# Patient Record
Sex: FEMALE | Race: WHITE
[De-identification: names, ages, dates, MRNs, and addresses within clinical notes are randomized per-mention and may not be internally consistent; named-entity substitution may affect disease eponyms.]

---

## 2018-07-16 ENCOUNTER — HOSPITAL ENCOUNTER (EMERGENCY)
Dept: HOSPITAL 62 - ER | Age: 38
Discharge: HOME | End: 2018-07-16
Payer: OTHER GOVERNMENT

## 2018-07-16 VITALS — DIASTOLIC BLOOD PRESSURE: 70 MMHG | SYSTOLIC BLOOD PRESSURE: 105 MMHG

## 2018-07-16 DIAGNOSIS — Z71.6: ICD-10-CM

## 2018-07-16 DIAGNOSIS — G89.29: Primary | ICD-10-CM

## 2018-07-16 DIAGNOSIS — M54.41: ICD-10-CM

## 2018-07-16 DIAGNOSIS — Z91.19: ICD-10-CM

## 2018-07-16 DIAGNOSIS — F17.210: ICD-10-CM

## 2018-07-16 PROCEDURE — 99283 EMERGENCY DEPT VISIT LOW MDM: CPT

## 2018-07-16 PROCEDURE — 99406 BEHAV CHNG SMOKING 3-10 MIN: CPT

## 2018-07-16 PROCEDURE — 96372 THER/PROPH/DIAG INJ SC/IM: CPT

## 2018-07-16 NOTE — ER DOCUMENT REPORT
ED Neck/Back Problem





- General


Chief Complaint: Low Back Pain


Stated Complaint: BACK PAIN


Time Seen by Provider: 18 17:45


Mode of Arrival: Ambulatory


Notes: 





37-year-old female presents to ED for complaint of low back pain radiating down 

her right leg.  She states that [facility] 

and got a shot in the back of some kind steroid and he put her on naproxen and 

she has been using Lidoderm patches.  She states she was told to go to her 

primary care doctor and get a pain management consult but she knew she was 

moved in about a month and a half and did not put in the consult.  She states 

she has not done any of the exercises that the doctor told her because she was 

not able to do them.  Patient is alert and oriented respirations regular and 

unlabored speaking in full sentences.


TRAVEL OUTSIDE OF THE [place] IN LAST 30 DAYS: No





- HPI


Patient complains to provider of: Pain, Lower back


Onset: Other - Several months


Onset: Chronic


Timing: Still present


Quality of pain: Achy, Sharp, Throbbing


Severity: Severe


Pain Level: 5


Context: Other - States she has not done anything that is been a accident he 

just has gotten worse over the last several days


Recent injury: No


Associated symptoms: Like prior neck/back pain, Radiation to leg - Right leg, 

Lower back pain.  denies: Constipation, Incontinence, Motor loss, Numbness/

tingling, Radiation to arm, Radiation to chest, Sensory loss, Sweaty, Unable to 

urinate, Upper back pain


Exacerbated by: Movement of trunk, Other


Relieved by: Nothing


Similar symptoms previously: Yes


Recently seen / treated by doctor: Yes





- Related Data


Allergies/Adverse Reactions: 


 





clindamycin [Clindamycin] Allergy (Verified 08/18/15 22:44)


 











Past Medical History





- General


Information source: Patient





- Social History


Smoking Status: Current Every Day Smoker


Cigarette use (# per day): Yes - Pack per day


Chew tobacco use (# tins/day): No


Smoking Education Provided: Yes - 4 minutes


Frequency of alcohol use: Social


Drug Abuse: None


Lives with: Family


Family History: Reviewed & Not Pertinent


Patient has suicidal ideation: No


Patient has homicidal ideation: No





- Past Medical History


Cardiac Medical History: Reports: Other - Splenic aneurysm


Pulmonary Medical History: Reports: None


EENT Medical History: Reports: None


Neurological Medical History: Reports: Hx Migraine, Other - Pseudotumor cerebra

, intracranial hypertension


Endocrine Medical History: Reports: None


Renal/ Medical History: Reports: None


Malignancy Medical History: Reports: None


GI Medical History: Reports: None


Musculoskeletal Medical History: Reports Hx Arthritis, Reports Hx Fibromyalgia, 

Reports Hx Musculoskeletal Deformity, Reports Hx Musculoskeletal Trauma


Skin Medical History: Reports None


Psychiatric Medical History: Reports: Hx Attention Deficit Hyperactivity 

Disorder


Traumatic Medical History: Reports: None


Infectious Medical History: Reports: None


Past Surgical History: Reports: Hx Abdominal Surgery - Abdominoplasty, Hx 

Appendectomy, Hx Breast Surgery - Breast augmentation, Hx  Section, Hx 

Cholecystectomy, Hx Genitourinary Surgery - bladder surgery, Hx Hysterectomy, 

Hx Orthopedic Surgery - Bunion surgery





- Immunizations


Hx Diphtheria, Pertussis, Tetanus Vaccination: Yes





Review of Systems





- Review of Systems


Constitutional: No symptoms reported


EENT: No symptoms reported


Cardiovascular: No symptoms reported


Respiratory: No symptoms reported


Gastrointestinal: No symptoms reported.  denies: Constipation, Poor appetite, 

Fecal incontinence


Genitourinary: No symptoms reported.  denies: Incontinence, Retention


Female Genitourinary: No symptoms reported


Musculoskeletal: Back pain - Radiating down the right leg


Skin: No symptoms reported


Hematologic/Lymphatic: No symptoms reported


Neurological/Psychological: No symptoms reported


-: Yes All other systems reviewed and negative





Physical Exam





- Vital signs


Vitals: 


 











Temp Pulse Resp BP Pulse Ox


 


 98.8 F   88   17   140/104 H  99 


 


 18 16:46  18 16:46  18 16:46  18 16:46  18 16:46











Interpretation: Normal





- General


General appearance: Appears well, Alert





- HEENT


Head: Normocephalic, Atraumatic


Eyes: Normal


Pupils: PERRL





- Respiratory


Respiratory status: No respiratory distress


Chest status: Nontender


Breath sounds: Normal


Chest palpation: Normal





- Cardiovascular


Rhythm: Regular


Heart sounds: Normal auscultation


Murmur: No





- Abdominal


Inspection: Normal


Distension: No distension


Bowel sounds: Normal


Tenderness: Nontender


Organomegaly: No organomegaly





- Back


Back: Normal, Nontender





- Extremities


General upper extremity: Normal inspection, Nontender, Normal color, Normal ROM

, Normal temperature


General lower extremity: Normal inspection, Nontender, Normal color, Normal ROM

, Normal temperature, Normal weight bearing.  No: Violet's sign





- Neurological


Neuro grossly intact: Yes


Cognition: Normal


Orientation: AAOx4


Lincoln Coma Scale Eye Opening: Spontaneous


Kake Coma Scale Verbal: Oriented


Kake Coma Scale Motor: Obeys Commands


Kake Coma Scale Total: 15


Speech: Normal


Cranial nerves: Normal


Cerebellar coordination: Normal


Motor strength normal: LUE, RUE, LLE, RLE


Additional motor exam normals: Equal 


Babinski reflex: Normal (flexor plantar)


Sensory: Normal


Biceps - Reflex grade: 2 = Normal


Triceps - Reflex grade: 2 = Normal


Brachioradialis - Reflex grade: 2 = Normal


Knee - Reflex grade: 2 = Normal


Ankle - Reflex grade: 2 = Normal





- Psychological


Associated symptoms: Normal affect, Normal mood





- Skin


Skin Temperature: Warm


Skin Moisture: Dry


Skin Color: Normal





Course





- Re-evaluation


Re-evalutation: 





18 18:20


Patient is seen today for chronic back pain she has been to the [facility] 

and her primary care doctor.  She states about a month ago the [facility] 

gave her a shot into the spine which helped her pain for a small period of time 

but then it came back.  He told her to follow-up with her primary care doctor 

and get a referral to pain management and to do back exercises and therapy and 

she states since she was going to move in about a month and half she did not 

follow-up with her primary care doctor or with the chronic pain management.  

She states she has not been doing therapy.  She states she has been doing 

Lidoderm patches and her 's naproxen.  Patient was treated today with 

Toradol Decadron IM and Lidoderm patch.  She was given a prescription for 

Lidoderm patches and Neurontin which she states she is out of.  She was given a 

week's supply so she can follow-up with her primary doctor before moving.  

After performing a Medical Screening Examination, I estimate there is LOW risk 

for EXPANDING OR RUPTURED ABDOMINAL AORTIC ANEURYSM, CAUDA EQUINA SYNDROME, 

EPIDURAL MASS LESION, or HERNIATED DISK CAUSING SEVERE SPINAL STENOSIS, thus I 

consider the discharge disposition reasonable.  I have reevaluated this patient 

multiple times and no significant life threatening changes are noted. The 

patient  and I have discussed the diagnosis and risks, and we agree with 

discharging home and close follow-up. We also discussed returning to the 

Emergency Department immediately if new or worsening symptoms occur with the 

understanding that symptoms and presentations can change. We have discussed the 

symptoms which are most concerning (e.g., saddle anesthesia, urinary or bowel 

incontinence or retention, changing or worsening pain) that necessitate 

immediate return.





- Vital Signs


Vital signs: 


 











Temp Pulse Resp BP Pulse Ox


 


 98.8 F   88   17   105/70   99 


 


 18 16:46  18 16:46  18 16:46  18 18:03  18 16:46














Discharge





- Discharge


Clinical Impression: 


Back pain


Qualifiers:


 Back pain location: low back pain Chronicity: chronic Back pain laterality: 

bilateral Sciatica presence: with sciatica Sciatica laterality: sciatica of 

right side Qualified Code(s): M54.41 - Lumbago with sciatica, right side





Condition: Stable


Disposition: HOME, SELF-CARE


Additional Instructions: 


LOW BACK PAIN:





     Three out of every four people will have an episode of disabling back pain 

during their lifetime.  Most commonly the pain is due to straining of the 

muscles and ligaments in the low back.


     Usual treatment includes: 


(1) Rest on a firm surface.  Avoid lying on your stomach.  


(2) Ice pack the painful area.  After a few days, gentle heat may be used 

intermittently to relax the area, or ice packs can be continued.  


(3) Medication may be needed -- muscle relaxers and antiinflammatory medicines 

are commonly used.  


(4) As the back improves, exercises are prescribed to strengthen the back and 

abdominal muscles.


     Your doctor will advise you on the proper care for your back at each stage 

in your recovery.  You may be better in a few days -- or healing may take 

several weeks.


     If new symptoms of a "herniated disc" (radiation of pain, numbness, or 

tingling down the back of the leg or weakness in the leg) occur, you should be 

re-examined.  Further testing may be necessary.





Chronic Back Pain





     Chronic back pain (pain persisting longer than three months) is a common 

problem. A medical evaluation can look for herniated disc, arthritis, 

osteoporosis, tumors, and infections. But at least half the time, there's no 

obvious treatable cause. Anxiety and depression tend to worsen back pain.


     Ibuprofen or other anti-inflammatory medicine can help. A heating pad, 

used for 15-20 minutes at a time, can ease pain. For this type of back pain, 

narcotic medicines should be avoided. Muscle relaxers are rarely helpful unless 

you're having spasms.


     Activity is important. Find an aerobic exercise program that your back can 

tolerate. Too much rest makes back pain worse. Specific back exercises are 

usually prescribed to strengthen the back and abdominal muscles. Often, a 

physical therapist can help. Avoid heavy lifting, working while bent over, or 

standing with both knees straight.


     Most back pain patients do better with a firm mattress.


     If new symptoms of a "herniated disc" (radiation of pain, numbness, or 

tingling down the back of the leg or weakness in the leg) occur, you should be 

re-examined.





Chronic Pain Control





     Stress, inactivity, and depression make pain more severe regardless of the 

cause of the pain.  Stress and poor physical condition can cause pain such as 

headaches and backache.


     Relaxation:  Rest in a quiet place with your eyes closed for 20 minutes 

twice daily.  Concentrate on a pleasant image, or simply "feel" your breathing.

  Clear your mind.


     Stress management:  Deal with your "stressors."  Either take action, or 

eliminate the stressor from your life.  Don't let things hang over you.  Accept 

those things you can't change.


     Nutrition:  Eat small, balanced meals -- don't skip, don't overeat.  Meals 

should be high-carbohydrate, low-sugar, low-fat.


     Exercise:  Exercise helps painful conditions and eases stress. Get 30 

minutes of moderate exercise, five days a week. Do an activity that does not 

flare your pain.


     Precautions:  Pain which continues to disrupt daily activities, or which 

changes in nature, requires a medical evaluation.  Pain Clinic referral is 

available.





     


We do not manage chronic pain in the Emergency Department.  We will try to 

appropriately help you through an acute flare of your chronic painful condition

, but for on-going chronic pain that does not improve, you will need to see 

your private doctor or pain management specialist.  We do not provide repeated 

medication management of chronic painful conditions.  If you wish, we can 

provide the name of local pain management physicians.





Toradol Injection





     You have been given an injection of ketorolac tromethamine (Toradol).  

This is an excellent, safe drug for pain control.  It also has potent 

antiinflammatory action.  You should have significant pain relief within about 

one hour.


     Toradol is not addicting and is non-sedating.  It does not interfere with 

driving or work.


     Call or return if you develop itching, hives, shortness of breath, or rash.





STEROID MEDICATION:





     You have been given an injection of medicine of the cortisone/steroid 

class.  This medication is used to control inflammation or allergy.  It is 

often continued as a pill for a short period of time, until the acute process 

subsides.


     There are usually no side effects from short-term use of cortisone-like 

medications.  Some persons feel an increased sense of well-being and are not 

sleepy at bedtime.  Long-term use of cortisone medications is best avoided, 

unless required for a severe condition.  If your condition does not remit, or 

relapses after the course of corticosteroid medication, you should consult your 

physician.





Stretching Exercises for the Back





     The physician has recommended that you begin stretching exercises for your 

back.  These are often used even while the back is painful. However, you should 

notify the physician if the activities seem to increase your pain.


     PELVIC TILT:  Lie flat on your back with knees bent.  Tighten your stomach 

and buttock muscles so it flattens your lower back against the floor.  Hold 10 

seconds.  Repeat 10 times, twice daily.


     KNEE RAISE:  Lying on the back with knees bent, raise one knee to your 

chest, then the other.  Hold both knees against the chest 10 seconds, then 

lower one knee at a time.  Repeat 10 times, twice daily.


     PARTIAL TRUNK RAISE:  Lie face down, arms at your sides.  Keeping your 

waist on the floor, use your arms raise your chest up.  Support yourself on 

your elbows for 30 seconds.  Repeat twice daily, increasing the time to two 

minutes as you recover.





ICE PACKS:


     Apply ice packs frequently against the painful area.  Many different 

schedules are recommended, such as "20 minutes on, 20 minutes off" or "one hour 

ice, two hours rest."  If you need to work, you may need to go longer between 

ice treatments.  You should plan to have the area ice packed AT LEAST one 

fourth of the time.


     The ice should be applied over the wrap, tape, or splint, or over a layer 

of cloth -- not directly against the skin.  Some ice bags have a built-in cloth 

and can be put directly on the skin.





WARM PACKS:


     After approximately two days, apply gentle heat (such as a heating pad or 

hot water bottle) for about 20 to 30 minutes about every two hours -- at least 

four times daily.  Warmth and elevation will help you make a more rapid recovery

, and will ease the pain considerably.


     Do not use HOT heat, and never apply heat for longer than 30 minutes.  The 

continuous heat can invisibly damage skin and muscles -- even when no burn is 

seen on the surface.  Damaged muscles can make you MORE sore.








FOLLOW-UP CARE:


If you have been referred to a physician for follow-up care, call the physician

s office for an appointment as you were instructed or within the next two days.

  If you experience worsening or a significant change in your symptoms, notify 

the physician immediately or return to the Emergency Department at any time for 

re-evaluation.


Prescriptions: 


Gabapentin [Neurontin 400 mg Capsule] 400 mg PO BID #14 capsule


Lidocaine [Lidoderm 5% (700 mg) Transdermal Patch] 1 patch TP DAILY #14 

adh..patch


Naproxen 500 mg PO BIDP PRN #14 tablet


 PRN Reason: 


Forms:  Smoking Cessation Education